# Patient Record
Sex: FEMALE
[De-identification: names, ages, dates, MRNs, and addresses within clinical notes are randomized per-mention and may not be internally consistent; named-entity substitution may affect disease eponyms.]

---

## 2019-01-28 ENCOUNTER — HOSPITAL ENCOUNTER (EMERGENCY)
Dept: HOSPITAL 90 - EDH | Age: 59
Discharge: HOME | End: 2019-01-28
Payer: COMMERCIAL

## 2019-01-28 VITALS — HEIGHT: 60 IN | WEIGHT: 139.99 LBS | BODY MASS INDEX: 27.48 KG/M2

## 2019-01-28 DIAGNOSIS — E78.5: ICD-10-CM

## 2019-01-28 DIAGNOSIS — S01.21XA: ICD-10-CM

## 2019-01-28 DIAGNOSIS — I10: ICD-10-CM

## 2019-01-28 DIAGNOSIS — E11.9: ICD-10-CM
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
## 2019-01-28 DIAGNOSIS — E07.9: ICD-10-CM

## 2019-01-28 DIAGNOSIS — S02.2XXA: Primary | ICD-10-CM
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
## 2019-01-28 PROCEDURE — 90714 TD VACC NO PRESV 7 YRS+ IM: CPT

## 2019-01-28 PROCEDURE — 12011 RPR F/E/E/N/L/M 2.5 CM/<: CPT

## 2019-01-28 PROCEDURE — 70160 X-RAY EXAM OF NASAL BONES: CPT

## 2019-01-28 PROCEDURE — 90471 IMMUNIZATION ADMIN: CPT

## 2019-04-19 ENCOUNTER — HOSPITAL ENCOUNTER (OUTPATIENT)
Dept: HOSPITAL 90 - EDH | Age: 59
Setting detail: OBSERVATION
Discharge: HOME | End: 2019-04-19
Attending: INTERNAL MEDICINE | Admitting: INTERNAL MEDICINE
Payer: COMMERCIAL

## 2019-04-19 VITALS — DIASTOLIC BLOOD PRESSURE: 79 MMHG | SYSTOLIC BLOOD PRESSURE: 158 MMHG

## 2019-04-19 VITALS — SYSTOLIC BLOOD PRESSURE: 151 MMHG | DIASTOLIC BLOOD PRESSURE: 76 MMHG

## 2019-04-19 VITALS — DIASTOLIC BLOOD PRESSURE: 85 MMHG | SYSTOLIC BLOOD PRESSURE: 138 MMHG

## 2019-04-19 VITALS — SYSTOLIC BLOOD PRESSURE: 157 MMHG | DIASTOLIC BLOOD PRESSURE: 79 MMHG

## 2019-04-19 VITALS — BODY MASS INDEX: 25.44 KG/M2 | WEIGHT: 126.2 LBS | HEIGHT: 59 IN

## 2019-04-19 DIAGNOSIS — E78.5: ICD-10-CM

## 2019-04-19 DIAGNOSIS — E78.00: ICD-10-CM

## 2019-04-19 DIAGNOSIS — I10: ICD-10-CM

## 2019-04-19 DIAGNOSIS — Z79.899: ICD-10-CM

## 2019-04-19 DIAGNOSIS — E83.42: ICD-10-CM

## 2019-04-19 DIAGNOSIS — R00.2: Primary | ICD-10-CM

## 2019-04-19 DIAGNOSIS — E11.9: ICD-10-CM

## 2019-04-19 DIAGNOSIS — E03.9: ICD-10-CM

## 2019-04-19 LAB
ALBUMIN SERPL-MCNC: 3.6 G/DL (ref 3.5–5)
ALT SERPL-CCNC: 21 U/L (ref 12–78)
AST SERPL-CCNC: 21 U/L (ref 10–37)
BASOPHILS NFR BLD AUTO: 0.4 % (ref 0–5)
BILIRUB SERPL-MCNC: 0.7 MG/DL (ref 0.2–1)
BNP SERPL-MCNC: < 5 PG/ML (ref 0–100)
BUN SERPL-MCNC: 19 MG/DL (ref 7–18)
CHLORIDE SERPL-SCNC: 102 MMOL/L (ref 101–111)
CK SERPL-CCNC: 57 U/L (ref 21–232)
CO2 SERPL-SCNC: 25 MMOL/L (ref 21–32)
CREAT SERPL-MCNC: 0.9 MG/DL (ref 0.5–1.5)
EOSINOPHIL NFR BLD AUTO: 1 % (ref 0–8)
ERYTHROCYTE [DISTWIDTH] IN BLOOD BY AUTOMATED COUNT: 13.6 % (ref 11–15.5)
GFR SERPL CREATININE-BSD FRML MDRD: 68 ML/MIN (ref 60–?)
GLUCOSE SERPL-MCNC: 152 MG/DL (ref 70–105)
HCT VFR BLD AUTO: 37.1 % (ref 36–48)
LYMPHOCYTES NFR SPEC AUTO: 19.6 % (ref 21–51)
MAGNESIUM SERPL-MCNC: 1.3 MG/DL (ref 1.8–2.4)
MAGNESIUM SERPL-MCNC: 2 MG/DL (ref 1.8–2.4)
MCH RBC QN AUTO: 29.9 PG (ref 27–33)
MCHC RBC AUTO-ENTMCNC: 34.1 G/DL (ref 32–36)
MCV RBC AUTO: 87.6 FL (ref 79–99)
MONOCYTES NFR BLD AUTO: 6 % (ref 3–13)
NEUTROPHILS NFR BLD AUTO: 73 % (ref 40–77)
NRBC BLD MANUAL-RTO: 0.1 % (ref 0–0.19)
PLATELET # BLD AUTO: 193 K/UL (ref 130–400)
POTASSIUM SERPL-SCNC: 3.6 MMOL/L (ref 3.5–5.1)
PROT SERPL-MCNC: 6.9 G/DL (ref 6–8.3)
RBC # BLD AUTO: 4.23 MIL/UL (ref 4–5.5)
SODIUM SERPL-SCNC: 140 MMOL/L (ref 136–145)
TSH SERPL DL<=0.05 MIU/L-ACNC: 1.67 UIU/ML (ref 0.36–3.74)
WBC # BLD AUTO: 8.4 K/UL (ref 4.8–10.8)

## 2019-04-19 PROCEDURE — 83735 ASSAY OF MAGNESIUM: CPT

## 2019-04-19 PROCEDURE — 82948 REAGENT STRIP/BLOOD GLUCOSE: CPT

## 2019-04-19 PROCEDURE — 93005 ELECTROCARDIOGRAM TRACING: CPT

## 2019-04-19 PROCEDURE — 84443 ASSAY THYROID STIM HORMONE: CPT

## 2019-04-19 PROCEDURE — 80053 COMPREHEN METABOLIC PANEL: CPT

## 2019-04-19 PROCEDURE — 36415 COLL VENOUS BLD VENIPUNCTURE: CPT

## 2019-04-19 PROCEDURE — 84484 ASSAY OF TROPONIN QUANT: CPT

## 2019-04-19 PROCEDURE — 82550 ASSAY OF CK (CPK): CPT

## 2019-04-19 PROCEDURE — 83880 ASSAY OF NATRIURETIC PEPTIDE: CPT

## 2019-04-19 PROCEDURE — 85025 COMPLETE CBC W/AUTO DIFF WBC: CPT

## 2019-04-19 PROCEDURE — 96372 THER/PROPH/DIAG INJ SC/IM: CPT

## 2019-04-19 PROCEDURE — 99283 EMERGENCY DEPT VISIT LOW MDM: CPT

## 2019-04-19 NOTE — NUR
AM ASSESSMENT

PT LAYING IN BED, HOB ELEVATED 30 DEGREES, RESTING.  A/O X 3.  NO SOB.  NO DISTRESS NOTED.  
DENIES CHEST PAIN OR DISCOMFORT.  DENIES PALPITATIONS.  TELE: SR 80s.  DENIES N/V AND/OR 
DIARRHEA.  GBW.  UP W/ASSISTANCE.  INSTRUCTED TO CALL FOR ASSISTANCE.  CALL NASEEM W/IN REACH.

-------------------------------------------------------------------------------

Addendum: 04/19/19 at 1237 by EDY SHANKS RN RN

-------------------------------------------------------------------------------

Amended: Links added.

## 2019-04-19 NOTE — NUR
DISCHARGE

VERBAL & WRITTEN DISCHARGE INSTRUCTIONS REVIEWED & GIVEN TO PT & DAUGHTER IN [place].  
PROPER CARE & MGT OF PALPITATIONS REVIEWED.  PT TO CONTINUE HOME MEDICATIONS.  F/U W/PCP IN 
1 WEEK.  TELE KYLE REMOVED.  IV DISCONTINUED.  PT & DAUGHTER TO GATHER PERSONAL BELONGINGS.  
WILL NOTIFY STAFF WHEN READY TO BE TAKEN TO PRIVATE VEHICLE.

## 2019-04-19 NOTE — NUR
DISCHARGE

PT TAKEN TO PRIVATE VEHICLE VIA WC BY CASANDRA TORRES.  DAUGHTER AWAITING FOR PT IN VEHICLE.  NO 
DISTRESS NOTED.